# Patient Record
Sex: FEMALE | Race: WHITE | NOT HISPANIC OR LATINO
[De-identification: names, ages, dates, MRNs, and addresses within clinical notes are randomized per-mention and may not be internally consistent; named-entity substitution may affect disease eponyms.]

---

## 2022-12-23 ENCOUNTER — NON-APPOINTMENT (OUTPATIENT)
Age: 32
End: 2022-12-23

## 2023-03-08 PROBLEM — Z00.00 ENCOUNTER FOR PREVENTIVE HEALTH EXAMINATION: Status: ACTIVE | Noted: 2023-03-08

## 2023-03-15 ENCOUNTER — APPOINTMENT (OUTPATIENT)
Dept: PULMONOLOGY | Facility: CLINIC | Age: 33
End: 2023-03-15
Payer: COMMERCIAL

## 2023-03-15 VITALS
BODY MASS INDEX: 24.27 KG/M2 | HEART RATE: 83 BPM | TEMPERATURE: 97.7 F | SYSTOLIC BLOOD PRESSURE: 114 MMHG | WEIGHT: 137 LBS | OXYGEN SATURATION: 98 % | RESPIRATION RATE: 17 BRPM | HEIGHT: 63 IN | DIASTOLIC BLOOD PRESSURE: 74 MMHG

## 2023-03-15 DIAGNOSIS — J30.2 OTHER SEASONAL ALLERGIC RHINITIS: ICD-10-CM

## 2023-03-15 DIAGNOSIS — G47.10 HYPERSOMNIA, UNSPECIFIED: ICD-10-CM

## 2023-03-15 DIAGNOSIS — Z86.59 PERSONAL HISTORY OF OTHER MENTAL AND BEHAVIORAL DISORDERS: ICD-10-CM

## 2023-03-15 DIAGNOSIS — G47.50 PARASOMNIA, UNSPECIFIED: ICD-10-CM

## 2023-03-15 DIAGNOSIS — Z78.9 OTHER SPECIFIED HEALTH STATUS: ICD-10-CM

## 2023-03-15 PROCEDURE — 99204 OFFICE O/P NEW MOD 45 MIN: CPT

## 2023-03-16 NOTE — ASSESSMENT
[FreeTextEntry1] : 33 yo F presents for initial evaluation for abnormal movements and talking in her sleep as well as daytime hypersomnolence. \par Unclear if she is having RBD vs. underlying central disorder of hypersomnolence with parasomnias.\par Will send for diagnostic PSG and MSLT.\par Have requested she send her prior PSG from the other sleep lab for my review, medical release form completed. \par

## 2023-03-16 NOTE — REVIEW OF SYSTEMS
[Anxious] : anxious [Difficulty Initiating Sleep] : no difficulty falling asleep [Difficulty Maintaining Sleep] : no difficulty maintaining sleep [Lower Extremity Discomfort] : no lower extremity discomfort [Unusual Sleep Behavior] : unusual sleep behavior [Hypersomnolence] : sleeping much more than usual [Cataplexy] :  no cataplexy [Hypnogogic Hallucinations] : no hypnogogic hallucinations [Hypnopompic Hallucinations] : no hypnopompic hallucinations [Sleep Paralysis] : sleep paralysis [Negative] : Musculoskeletal [FreeTextEntry8] : per hpi [FreeTextEntry3] : per hpi [de-identified] : per hpi [de-identified] : per hpi

## 2023-03-16 NOTE — REVIEW OF SYSTEMS
[Anxious] : anxious [Difficulty Initiating Sleep] : no difficulty falling asleep [Difficulty Maintaining Sleep] : no difficulty maintaining sleep [Lower Extremity Discomfort] : no lower extremity discomfort [Unusual Sleep Behavior] : unusual sleep behavior [Hypersomnolence] : sleeping much more than usual [Cataplexy] :  no cataplexy [Hypnogogic Hallucinations] : no hypnogogic hallucinations [Hypnopompic Hallucinations] : no hypnopompic hallucinations [Sleep Paralysis] : sleep paralysis [Negative] : Musculoskeletal [FreeTextEntry8] : per hpi [FreeTextEntry3] : per hpi [de-identified] : per hpi [de-identified] : per hpi

## 2023-03-16 NOTE — HISTORY OF PRESENT ILLNESS
[FreeTextEntry1] : 33 yo F presents for initial evaluation for abnormal movements and talking in her sleep as well as daytime hypersomnolence. \par Referred from [facility]- Dr. Calvin\par In lab PSG performed - no JENS, doesn’t have copies of the reports\par \par Very vivid/lifelike dreams nightly, lucid dreams\par Sleep talking nightly\par Dream enactment - feels shes 'awake' when dreaming\par Punching and kicking in her sleep\par Has banged her face/nose into the wall\par Symptoms started in high school\par TIred "all the time", has fallen asleep at work, drinks tea and coffee. \par Denies sleep walking or eating\par Occasional snoring\par \par Works with a therapist and psychiatrist, traumatic relationship about a year ago.\par Was on Modafinil 200 mg in the past (provided by her psychiatrist) for daytime sleepiness- but did not notice a difference in her sleepiness. 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 \par Naps some days - 30 min, naps are refreshing\par \par +Sleep paralysis\par Denies hallucinations. Denies cataplexy. \par \par ESS:\par EPWORTH SLEEPINESS SCALE\par 0 = Never would doze\par 1 = Slight chance of dozing\par 2 = Moderate chance of dozing\par 3 = High chance of dozing \par \par Situation/Chance of dozing\par Sitting and reading 3\par Watching TV 2\par Sitting inactive in a public place (eg a theatre or a meeting) 1-2\par As a passenger in a car for an hour without a break 2\par Lying down to rest in the afternoon when circumstances permit 3\par Sitting and talking to someone 1\par Sitting quietly after lunch without alcohol 2\par In a car, while stopped for a few minutes in traffic 1\par TOTAL SCORE\par \par Meds: \par Cymbalta 30 mg\par Claritin\par OCPs \par \par Social alcohol use\par No drug use\par

## 2023-03-16 NOTE — PHYSICAL EXAM
[General Appearance - Well Developed] : well developed [General Appearance - Well Nourished] : well nourished [Normal Conjunctiva] : the conjunctiva exhibited no abnormalities [Eyelids - No Xanthelasma] : the eyelids demonstrated no xanthelasmas [Normal Oropharynx] : normal oropharynx [Neck Appearance] : the appearance of the neck was normal [Heart Rate And Rhythm] : heart rate was normal and rhythm regular [Heart Sounds] : normal S1 and S2 [] : no respiratory distress [Respiration, Rhythm And Depth] : normal respiratory rhythm and effort [Exaggerated Use Of Accessory Muscles For Inspiration] : no accessory muscle use [Abnormal Walk] : normal gait [Auscultation Breath Sounds / Voice Sounds] : lungs were clear to auscultation bilaterally [Motor Tone] : muscle strength and tone were normal [Nail Clubbing] : no clubbing of the fingernails [Cyanosis, Localized] : no localized cyanosis [Skin Color & Pigmentation] : normal skin color and pigmentation [Cranial Nerves] : cranial nerves 2-12 were intact [Motor Exam] : the motor exam was normal [Oriented To Time, Place, And Person] : oriented to person, place, and time [Impaired Insight] : insight and judgment were intact [Affect] : the affect was normal [Mood] : the mood was normal

## 2023-03-16 NOTE — ASSESSMENT
[FreeTextEntry1] : 31 yo F presents for initial evaluation for abnormal movements and talking in her sleep as well as daytime hypersomnolence. \par Unclear if she is having RBD vs. underlying central disorder of hypersomnolence with parasomnias.\par Will send for diagnostic PSG and MSLT.\par Have requested she send her prior PSG from the other sleep lab for my review, medical release form completed. \par

## 2023-03-16 NOTE — HISTORY OF PRESENT ILLNESS
[FreeTextEntry1] : 31 yo F presents for initial evaluation for abnormal movements and talking in her sleep as well as daytime hypersomnolence. \par Referred from [facility]- Dr. Calvin\par In lab PSG performed - no JENS, doesn’t have copies of the reports\par \par Very vivid/lifelike dreams nightly, lucid dreams\par Sleep talking nightly\par Dream enactment - feels shes 'awake' when dreaming\par Punching and kicking in her sleep\par Has banged her face/nose into the wall\par Symptoms started in high school\par TIred "all the time", has fallen asleep at work, drinks tea and coffee. \par Denies sleep walking or eating\par Occasional snoring\par \par Works with a therapist and psychiatrist, traumatic relationship about a year ago.\par Was on Modafinil 200 mg in the past (provided by her psychiatrist) for daytime sleepiness- but did not notice a difference in her sleepiness. 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 \par Naps some days - 30 min, naps are refreshing\par \par +Sleep paralysis\par Denies hallucinations. Denies cataplexy. \par \par ESS:\par EPWORTH SLEEPINESS SCALE\par 0 = Never would doze\par 1 = Slight chance of dozing\par 2 = Moderate chance of dozing\par 3 = High chance of dozing \par \par Situation/Chance of dozing\par Sitting and reading 3\par Watching TV 2\par Sitting inactive in a public place (eg a theatre or a meeting) 1-2\par As a passenger in a car for an hour without a break 2\par Lying down to rest in the afternoon when circumstances permit 3\par Sitting and talking to someone 1\par Sitting quietly after lunch without alcohol 2\par In a car, while stopped for a few minutes in traffic 1\par TOTAL SCORE\par \par Meds: \par Cymbalta 30 mg\par Claritin\par OCPs \par \par Social alcohol use\par No drug use\par

## 2023-05-25 ENCOUNTER — OUTPATIENT (OUTPATIENT)
Dept: OUTPATIENT SERVICES | Facility: HOSPITAL | Age: 33
LOS: 1 days | End: 2023-05-25
Payer: COMMERCIAL

## 2023-05-25 PROCEDURE — 95810 POLYSOM 6/> YRS 4/> PARAM: CPT

## 2023-05-29 ENCOUNTER — FORM ENCOUNTER (OUTPATIENT)
Age: 33
End: 2023-05-29

## 2023-05-30 ENCOUNTER — OUTPATIENT (OUTPATIENT)
Dept: OUTPATIENT SERVICES | Facility: HOSPITAL | Age: 33
LOS: 1 days | End: 2023-05-30
Payer: COMMERCIAL

## 2023-05-30 ENCOUNTER — APPOINTMENT (OUTPATIENT)
Dept: SLEEP CENTER | Facility: CLINIC | Age: 33
End: 2023-05-30
Payer: COMMERCIAL

## 2023-05-30 PROCEDURE — 95805 MULTIPLE SLEEP LATENCY TEST: CPT

## 2023-05-30 PROCEDURE — 95810 POLYSOM 6/> YRS 4/> PARAM: CPT | Mod: 26

## 2023-05-30 PROCEDURE — 95810 POLYSOM 6/> YRS 4/> PARAM: CPT

## 2023-05-30 PROCEDURE — 95805 MULTIPLE SLEEP LATENCY TEST: CPT | Mod: 26

## 2023-05-31 ENCOUNTER — TRANSCRIPTION ENCOUNTER (OUTPATIENT)
Age: 33
End: 2023-05-31

## 2023-06-01 DIAGNOSIS — G47.33 OBSTRUCTIVE SLEEP APNEA (ADULT) (PEDIATRIC): ICD-10-CM

## 2023-06-13 ENCOUNTER — APPOINTMENT (OUTPATIENT)
Dept: PULMONOLOGY | Facility: CLINIC | Age: 33
End: 2023-06-13
Payer: COMMERCIAL

## 2023-06-13 PROCEDURE — 99442: CPT

## 2023-08-14 ENCOUNTER — APPOINTMENT (OUTPATIENT)
Dept: PULMONOLOGY | Facility: CLINIC | Age: 33
End: 2023-08-14
Payer: COMMERCIAL

## 2023-08-14 DIAGNOSIS — G47.52 REM SLEEP BEHAVIOR DISORDER: ICD-10-CM

## 2023-08-14 PROCEDURE — 99214 OFFICE O/P EST MOD 30 MIN: CPT | Mod: 95

## 2023-08-14 NOTE — ASSESSMENT
[FreeTextEntry1] : 33 yo F with abnormal movements and talking in her sleep as well as daytime hypersomnolence.  Evidence of REM without atonia on PSG, MSLT was within normal limits. 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   Plan: Continue to monitor with symptom logs, avoid sleep deprivation, maintain regular sleep schedule.  Follow up in 6 months If continued symptoms or worsening/new symptoms will consider repeat PSG and referral to Neurology

## 2023-08-14 NOTE — HISTORY OF PRESENT ILLNESS
[FreeTextEntry1] : 32 yo F presents for follow up.   3/15/23 Initial evaluation for abnormal movements and talking in her sleep as well as daytime hypersomnolence.  Referred from [facility]- Dr. Calvin In lab PSG performed - no evidence of JENS, doesn't have copies of the reports  Very vivid/lifelike dreams nightly, lucid dreams Sleep talking nightly Dream enactment - feels she's 'awake' when dreaming Punching and kicking in her sleep Has banged her face/nose into the wall Symptoms started in high school TIred "all the time", has fallen asleep at work, drinks tea and coffee.  Denies sleep walking or eating Occasional snoring  Works with a therapist and psychiatrist, traumatic relationship about a year ago. Was on Modafinil 200 mg in the past (provided by her psychiatrist) for daytime sleepiness- but did not notice a difference in her sleepiness.  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  Naps some days - 30 min, naps are refreshing  +Sleep paralysis Denies hallucinations. Denies cataplexy.   ESS: EPWORTH SLEEPINESS SCALE Situation/Chance of dozing Sitting and reading 3 Watching TV 2 Sitting inactive in a public place (eg a theatre or a meeting) 1-2 As a passenger in a car for an hour without a break 2 Lying down to rest in the afternoon when circumstances permit 3 Sitting and talking to someone 1 Sitting quietly after lunch without alcohol 2 In a car, while stopped for a few minutes in traffic 1 TOTAL SCORE  Meds:  Cymbalta 30 mg Claritin OCPs   Social alcohol use No drug use  Follow up Televisit 8/14/23: PSG/MSLT 5/30/23: No evidence of JENS. REM without atonia, limb movements and somniloquy during REM sleep was observed  MSOL 15.6 min, no SOREMS.  When we spoke in June, discussed increased occurrence of RBD with SSRI/SNRI use. Plan was to taper off Cymbalta completely and monitor symptoms/maintain symptom log. Has been waking up at 8:30 am as her kitchen is being renovated, very sleepy initially but awake throughout the day.  Falling asleep and staying asleep.  Since stopping Cymbalta, states she has had 3 episodes of sleep talking or yelling (less frequent).     [REM Sleep Behavior Disorder] : REM sleep behavior disorder

## 2023-08-14 NOTE — REVIEW OF SYSTEMS
[Anxious] : anxious [Unusual Sleep Behavior] : unusual sleep behavior [Hypersomnolence] : sleeping much more than usual [Sleep Paralysis] : sleep paralysis [Negative] : Musculoskeletal [Difficulty Initiating Sleep] : no difficulty falling asleep [Difficulty Maintaining Sleep] : no difficulty maintaining sleep [Lower Extremity Discomfort] : no lower extremity discomfort [Cataplexy] :  no cataplexy [Hypnogogic Hallucinations] : no hypnogogic hallucinations [Hypnopompic Hallucinations] : no hypnopompic hallucinations [FreeTextEntry3] : per hpi [FreeTextEntry8] : per hpi [de-identified] : per hpi [de-identified] : per hpi